# Patient Record
Sex: MALE | Race: WHITE | NOT HISPANIC OR LATINO | Employment: UNEMPLOYED | URBAN - METROPOLITAN AREA
[De-identification: names, ages, dates, MRNs, and addresses within clinical notes are randomized per-mention and may not be internally consistent; named-entity substitution may affect disease eponyms.]

---

## 2020-07-15 ENCOUNTER — LAB SERVICES (OUTPATIENT)
Dept: LAB | Age: 25
End: 2020-07-15

## 2020-07-18 LAB — SARS-COV-2 N GENE RESP QL NAA+PROBE: NEGATIVE

## 2023-09-30 ENCOUNTER — OFFICE VISIT (OUTPATIENT)
Dept: URGENT CARE | Facility: URGENT CARE | Age: 28
End: 2023-09-30
Payer: COMMERCIAL

## 2023-09-30 ENCOUNTER — ANCILLARY PROCEDURE (OUTPATIENT)
Dept: GENERAL RADIOLOGY | Facility: CLINIC | Age: 28
End: 2023-09-30
Attending: PHYSICIAN ASSISTANT
Payer: COMMERCIAL

## 2023-09-30 VITALS
TEMPERATURE: 98 F | SYSTOLIC BLOOD PRESSURE: 140 MMHG | HEART RATE: 103 BPM | DIASTOLIC BLOOD PRESSURE: 70 MMHG | OXYGEN SATURATION: 98 %

## 2023-09-30 DIAGNOSIS — S59.902A INJURY OF LEFT ELBOW, INITIAL ENCOUNTER: ICD-10-CM

## 2023-09-30 DIAGNOSIS — S59.902A INJURY OF LEFT ELBOW, INITIAL ENCOUNTER: Primary | ICD-10-CM

## 2023-09-30 PROCEDURE — 73070 X-RAY EXAM OF ELBOW: CPT | Mod: TC | Performed by: RADIOLOGY

## 2023-09-30 PROCEDURE — 99204 OFFICE O/P NEW MOD 45 MIN: CPT | Performed by: PHYSICIAN ASSISTANT

## 2023-09-30 NOTE — PATIENT INSTRUCTIONS
Follow up with ortho early this week    ED with numbness, loss of function, increased severity of pain

## 2023-09-30 NOTE — PROGRESS NOTES
SUBJECTIVE:  Chief Complaint   Patient presents with    Urgent Care    Elbow Pain     Left arm/elbow pain hard time moving arm      José Spears is a 28 year old male presents with a chief complaint of left elbow pain.  The injury occurred 1 day(s) ago.   The injury happened while walking down street tripped on planter without tree.  Significant pain with movement.  No loss of function or circulation distally    No past medical history on file.  No current outpatient medications on file.     Social History     Tobacco Use    Smoking status: Not on file    Smokeless tobacco: Not on file   Substance Use Topics    Alcohol use: Not on file       ROS:  Review of systems negative except as stated above.    EXAM:   BP (!) 140/70   Pulse 103   Temp 98  F (36.7  C) (Temporal)   SpO2 98%     Extremity: olecranon swollen, pain with flexion, extension, rotation, humerus nontender  GENERAL APPEARANCE: healthy, alert and no distress  EXTREMITIES: peripheral pulses normal  NEURO: Normal strength and tone, sensory exam grossly normal, mentation intact and speech normal    Results for orders placed or performed in visit on 09/30/23   XR Elbow Left 2 Views     Status: None    Narrative    EXAM: XR ELBOW LEFT 2 VIEWS  LOCATION: [facility] [place]  DATE: 9/30/2023    INDICATION: Left elbow pain after injury.  COMPARISON: None.      Impression    IMPRESSION: Large elbow joint effusion. No fracture visualized, although there is concern for an occult fracture due to the size of the effusion. Joint alignment is normal. No significant arthritic changes. Consider immobilization and follow-up   radiographs in 7-14 days.         ASSESSMENT:   (W59.000T) Injury of left elbow, initial encounter  (primary encounter diagnosis)  Comment: occult fracture possible  Plan: XR Elbow Left 2 Views, Orthopedic          Referral      Red flags and emergent follow up discussed, and understood by patient  Follow up with PCP  if symptoms worsen or fail to improve

## 2024-04-03 ENCOUNTER — HOSPITAL ENCOUNTER (OUTPATIENT)
Dept: ULTRASOUND IMAGING | Facility: HOSPITAL | Age: 29
Discharge: HOME | End: 2024-04-03
Attending: FAMILY MEDICINE | Admitting: FAMILY MEDICINE
Payer: COMMERCIAL

## 2024-04-03 ENCOUNTER — HOSPITAL ENCOUNTER (EMERGENCY)
Facility: HOSPITAL | Age: 29
Discharge: LEFT BEFORE DISCHARGE | End: 2024-04-03
Attending: EMERGENCY MEDICINE | Admitting: STUDENT IN AN ORGANIZED HEALTH CARE EDUCATION/TRAINING PROGRAM
Payer: COMMERCIAL

## 2024-04-03 ENCOUNTER — NURSE TRIAGE (OUTPATIENT)
Dept: NURSING | Facility: CLINIC | Age: 29
End: 2024-04-03

## 2024-04-03 ENCOUNTER — OFFICE VISIT (OUTPATIENT)
Dept: FAMILY MEDICINE | Facility: CLINIC | Age: 29
End: 2024-04-03
Payer: COMMERCIAL

## 2024-04-03 VITALS
HEART RATE: 91 BPM | WEIGHT: 198 LBS | OXYGEN SATURATION: 100 % | DIASTOLIC BLOOD PRESSURE: 81 MMHG | SYSTOLIC BLOOD PRESSURE: 136 MMHG | TEMPERATURE: 98.3 F | RESPIRATION RATE: 16 BRPM

## 2024-04-03 VITALS
TEMPERATURE: 97.4 F | WEIGHT: 198 LBS | SYSTOLIC BLOOD PRESSURE: 137 MMHG | BODY MASS INDEX: 24.62 KG/M2 | DIASTOLIC BLOOD PRESSURE: 70 MMHG | OXYGEN SATURATION: 98 % | RESPIRATION RATE: 20 BRPM | HEART RATE: 104 BPM | HEIGHT: 75 IN

## 2024-04-03 DIAGNOSIS — R17 JAUNDICE: ICD-10-CM

## 2024-04-03 DIAGNOSIS — B17.9 ACUTE HEPATITIS: Primary | ICD-10-CM

## 2024-04-03 DIAGNOSIS — Z53.21 PATIENT LEFT WITHOUT BEING SEEN: ICD-10-CM

## 2024-04-03 DIAGNOSIS — R50.9 FEVER, UNSPECIFIED FEVER CAUSE: ICD-10-CM

## 2024-04-03 DIAGNOSIS — R10.11 RUQ ABDOMINAL PAIN: ICD-10-CM

## 2024-04-03 LAB
ALBUMIN SERPL BCG-MCNC: 3.8 G/DL (ref 3.5–5.2)
ALBUMIN UR-MCNC: NEGATIVE MG/DL
ALP SERPL-CCNC: 259 U/L (ref 40–150)
ALT SERPL W P-5'-P-CCNC: 623 U/L (ref 0–70)
ANION GAP SERPL CALCULATED.3IONS-SCNC: 10 MMOL/L (ref 7–15)
APPEARANCE UR: CLEAR
AST SERPL W P-5'-P-CCNC: 496 U/L (ref 0–45)
BILIRUB DIRECT SERPL-MCNC: 4.52 MG/DL (ref 0–0.3)
BILIRUB SERPL-MCNC: 6.4 MG/DL
BILIRUB UR QL STRIP: NEGATIVE
BUN SERPL-MCNC: 3.8 MG/DL (ref 6–20)
CALCIUM SERPL-MCNC: 9.1 MG/DL (ref 8.6–10)
CHLORIDE SERPL-SCNC: 98 MMOL/L (ref 98–107)
COLOR UR AUTO: YELLOW
CREAT SERPL-MCNC: 0.64 MG/DL (ref 0.67–1.17)
DEPRECATED HCO3 PLAS-SCNC: 28 MMOL/L (ref 22–29)
EGFRCR SERPLBLD CKD-EPI 2021: >90 ML/MIN/1.73M2
ERYTHROCYTE [DISTWIDTH] IN BLOOD BY AUTOMATED COUNT: 13.5 % (ref 10–15)
GLUCOSE SERPL-MCNC: 91 MG/DL (ref 70–99)
GLUCOSE UR STRIP-MCNC: NEGATIVE MG/DL
HCT VFR BLD AUTO: 34.5 % (ref 40–53)
HGB BLD-MCNC: 12.4 G/DL (ref 13.3–17.7)
HGB UR QL STRIP: NEGATIVE
KETONES UR STRIP-MCNC: NEGATIVE MG/DL
LEUKOCYTE ESTERASE UR QL STRIP: NEGATIVE
MCH RBC QN AUTO: 33.7 PG (ref 26.5–33)
MCHC RBC AUTO-ENTMCNC: 35.9 G/DL (ref 31.5–36.5)
MCV RBC AUTO: 94 FL (ref 78–100)
NITRATE UR QL: NEGATIVE
NRBC # BLD AUTO: 0 10E3/UL
NRBC BLD AUTO-RTO: 0 /100
PH UR STRIP: 6.5 [PH] (ref 5–8)
PLATELET # BLD AUTO: 124 10E3/UL (ref 150–450)
POTASSIUM SERPL-SCNC: 4.5 MMOL/L (ref 3.4–5.3)
PROT SERPL-MCNC: 7.5 G/DL (ref 6.4–8.3)
RBC # BLD AUTO: 3.68 10E6/UL (ref 4.4–5.9)
SODIUM SERPL-SCNC: 136 MMOL/L (ref 135–145)
SP GR UR STRIP: <=1.005 (ref 1–1.03)
UROBILINOGEN UR STRIP-ACNC: 0.2 E.U./DL
WBC # BLD AUTO: 10 10E3/UL (ref 4–11)

## 2024-04-03 PROCEDURE — 86803 HEPATITIS C AB TEST: CPT | Performed by: FAMILY MEDICINE

## 2024-04-03 PROCEDURE — 36415 COLL VENOUS BLD VENIPUNCTURE: CPT | Performed by: FAMILY MEDICINE

## 2024-04-03 PROCEDURE — 86665 EPSTEIN-BARR CAPSID VCA: CPT | Performed by: FAMILY MEDICINE

## 2024-04-03 PROCEDURE — 76705 ECHO EXAM OF ABDOMEN: CPT

## 2024-04-03 PROCEDURE — 85025 COMPLETE CBC W/AUTO DIFF WBC: CPT | Performed by: FAMILY MEDICINE

## 2024-04-03 PROCEDURE — 99281 EMR DPT VST MAYX REQ PHY/QHP: CPT

## 2024-04-03 PROCEDURE — 82248 BILIRUBIN DIRECT: CPT | Performed by: FAMILY MEDICINE

## 2024-04-03 PROCEDURE — 99214 OFFICE O/P EST MOD 30 MIN: CPT | Performed by: FAMILY MEDICINE

## 2024-04-03 PROCEDURE — 86706 HEP B SURFACE ANTIBODY: CPT | Performed by: FAMILY MEDICINE

## 2024-04-03 PROCEDURE — 85007 BL SMEAR W/DIFF WBC COUNT: CPT | Performed by: FAMILY MEDICINE

## 2024-04-03 PROCEDURE — 87340 HEPATITIS B SURFACE AG IA: CPT | Performed by: FAMILY MEDICINE

## 2024-04-03 PROCEDURE — 87389 HIV-1 AG W/HIV-1&-2 AB AG IA: CPT | Performed by: FAMILY MEDICINE

## 2024-04-03 PROCEDURE — 81003 URINALYSIS AUTO W/O SCOPE: CPT | Performed by: FAMILY MEDICINE

## 2024-04-03 PROCEDURE — 80053 COMPREHEN METABOLIC PANEL: CPT | Performed by: FAMILY MEDICINE

## 2024-04-03 RX ORDER — BENZONATATE 100 MG/1
100 CAPSULE ORAL 3 TIMES DAILY PRN
COMMUNITY

## 2024-04-03 RX ORDER — ONDANSETRON 4 MG/1
4 TABLET, ORALLY DISINTEGRATING ORAL EVERY 8 HOURS PRN
COMMUNITY

## 2024-04-03 ASSESSMENT — COLUMBIA-SUICIDE SEVERITY RATING SCALE - C-SSRS
1. IN THE PAST MONTH, HAVE YOU WISHED YOU WERE DEAD OR WISHED YOU COULD GO TO SLEEP AND NOT WAKE UP?: NO
2. HAVE YOU ACTUALLY HAD ANY THOUGHTS OF KILLING YOURSELF IN THE PAST MONTH?: NO
6. HAVE YOU EVER DONE ANYTHING, STARTED TO DO ANYTHING, OR PREPARED TO DO ANYTHING TO END YOUR LIFE?: NO

## 2024-04-03 ASSESSMENT — ACTIVITIES OF DAILY LIVING (ADL): ADLS_ACUITY_SCORE: 35

## 2024-04-03 NOTE — LETTER
April 3, 2024      José Spears  [address] [place] [address]        To Whom It May Concern:    José Spears  was seen on 4/3/2024   Please excuse him  due to illness.        Sincerely,        Jack Diane MD

## 2024-04-03 NOTE — PROGRESS NOTES
"OUTPATIENT VISIT NOTE                                                   Date of Visit: 4/3/2024     Chief Complaint   Patient presents with:  Fever: X 5 days, went to urgent care at [facility] on Saturday, pt states \"I was tested for a lot of things\" and everything was negative. Says his urine has been dark yellow, when he is hydrating, its bright yellow. Sees foaming residue when urinating. Side pain as well.            History of Present Illness   José Spears is a 28 year old male has had a fever as high as 104 consistently for the last week. Initially shaking chills and sweats.  Urine changed from dark yellow to bright yellow.  Lots of foam in urine. No dysuria. No blood in urine.  Lots of fatigue.  Headache.  Feels \"foggy\".  Thinks his face looks yellow--started last night.  Pain on both sides of abdomen, intermittently.  Mild pain to touch upper left chest.  No shortness of breath.  Nausea in the mornings.  Had vomiting in the morning for the last three days.  Has had cough--since January--has seemed to improved. Productive--2-3 weeks ago--resolved now.  Diarrhea--about a week ago.  No blood in stools.  No cramping. No miguel colored stools.  Last dose of antipyretic 9 hours ago.  No rashes.      Seen at [facility] three days ago with negative covid test, negative influenza, negative strep and negative CXR.  Given benzonatate, ondansetran and cough syrup.  The abdominal pain and yellow skin started two days ago.  No recent travel.    Smokes cigar once a month.  Alcohol 8-10 drinks a week.    States he feels better today than yesterday.             MEDICATIONS   Current Outpatient Medications   Medication Sig Dispense Refill    benzonatate (TESSALON) 100 MG capsule Take 100 mg by mouth 3 times daily as needed      ondansetron (ZOFRAN ODT) 4 MG ODT tab 4 mg every 8 hours as needed       No current facility-administered medications for this visit.         SOCIAL HISTORY   Social History     Tobacco " Use    Smoking status: Some Days     Types: Cigars    Smokeless tobacco: Not on file   Substance Use Topics    Alcohol use: Not on file           Physical Exam   Vitals:    04/03/24 1604   BP: 136/81   BP Location: Right arm   Patient Position: Sitting   Cuff Size: Adult Regular   Pulse: 91   Resp: 16   Temp: 98.3  F (36.8  C)   TempSrc: Oral   SpO2: 100%   Weight: 89.8 kg (198 lb)        GENERAL: Alert, Oriented. NAD  EYES: sclera mildly icteric  HENT:  Ears: R TM pearly gray with normal landmarks. L TM pearly gray with normal landmarks.  Nose:  Clear  Oropharynx: No erythema. No exudate.  NECK: Neck supple. No adenopathy  LUNGS:  Clear to ascultation,  No crackles.  No wheezing.  Normal effort.  HEART:  RRR without murmur rub or gallop.  Chest Wall: mild tenderness left upper sternal border  ABDOMEN:  +BS. Slight tenderness right upper quadrant and less tenderness left upper quadrant. No hepatomegaly or spleen tip.  Soft, no guarding, rebound, rigidity,mass. No CVA tenderness    SKIN:  slight jaundice.   Extremities: no swelling.      Diagnostic Studies   LABS:  Results for orders placed or performed in visit on 04/03/24   US Abdomen Limited     Status: None    Narrative    EXAM: US ABDOMEN LIMITED  LOCATION: [facility]  DATE: 4/3/2024    INDICATION: RUQ pain, jaundice, fever  COMPARISON: None.  TECHNIQUE: Limited abdominal ultrasound.    FINDINGS:    GALLBLADDER: Normal. No gallstones, wall thickening, or pericholecystic fluid. Negative sonographic Vegas's sign.    BILE DUCTS: No biliary dilatation. The common duct measures 3 mm.    LIVER: Normal parenchyma with smooth contour. No focal mass.    RIGHT KIDNEY: No hydronephrosis.    PANCREAS: The visualized portions are normal.    No ascites.    The spleen is enlarged measuring 17.2 cm.      Impression    IMPRESSION:  1.  Gallbladder and liver within normal limits.  2.  Splenomegaly with the spleen measuring 17.2 cm.       UA Macroscopic  with reflex to Microscopic and Culture     Status: Normal    Specimen: Urine, Clean Catch   Result Value Ref Range    Color Urine Yellow Colorless, Straw, Light Yellow, Yellow    Appearance Urine Clear Clear    Glucose Urine Negative Negative mg/dL    Bilirubin Urine Negative Negative    Ketones Urine Negative Negative mg/dL    Specific Gravity Urine <=1.005 1.005 - 1.030    Blood Urine Negative Negative    pH Urine 6.5 5.0 - 8.0    Protein Albumin Urine Negative Negative mg/dL    Urobilinogen Urine 0.2 0.2, 1.0 E.U./dL    Nitrite Urine Negative Negative    Leukocyte Esterase Urine Negative Negative    Narrative    Microscopic not indicated   Basic metabolic panel     Status: Abnormal   Result Value Ref Range    Sodium 136 135 - 145 mmol/L    Potassium 4.5 3.4 - 5.3 mmol/L    Chloride 98 98 - 107 mmol/L    Carbon Dioxide (CO2) 28 22 - 29 mmol/L    Anion Gap 10 7 - 15 mmol/L    Urea Nitrogen 3.8 (L) 6.0 - 20.0 mg/dL    Creatinine 0.64 (L) 0.67 - 1.17 mg/dL    GFR Estimate >90 >60 mL/min/1.73m2    Calcium 9.1 8.6 - 10.0 mg/dL    Glucose 91 70 - 99 mg/dL   Hepatic function panel     Status: Abnormal   Result Value Ref Range    Protein Total 7.5 6.4 - 8.3 g/dL    Albumin 3.8 3.5 - 5.2 g/dL    Bilirubin Total 6.4 (H) <=1.2 mg/dL    Alkaline Phosphatase 259 (H) 40 - 150 U/L     (H) 0 - 45 U/L     (HH) 0 - 70 U/L    Bilirubin Direct 4.52 (H) 0.00 - 0.30 mg/dL   CBC with Platelets & Differential     Status: None (In process)    Narrative    The following orders were created for panel order CBC with Platelets & Differential.  Procedure                               Abnormality         Status                     ---------                               -----------         ------                     CBC with platelets and d...[[id number]]                      In process                 RBC and Platelet Morphology[[id number]]                      In process                   Please view results for these tests on the  individual orders.            Assessment and Plan     Acute hepatitis  - PRIMARY CARE FOLLOW-UP SCHEDULING  Jaundice  - UA Macroscopic with reflex to Microscopic and Culture  - CBC with Platelets & Differential  - Basic metabolic panel  - Hepatic function panel  - HIV Antigen Antibody Combo Cascade  - Hepatitis B surface antigen  - Hepatitis B Surface Antibody  - EBV Capsid Antibody IgM  - Hepatitis C Screen Reflex to HCV RNA Quant and Genotype  - US Abdomen Limited  - Hepatitis B surface antigen  - Hepatitis B Surface Antibody  - EBV Capsid Antibody IgM  - Hepatitis C Screen Reflex to HCV RNA Quant and Genotype  Fever, unspecified fever cause  - UA Macroscopic with reflex to Microscopic and Culture  - CBC with Platelets & Differential  - Basic metabolic panel  - Hepatic function panel  - HIV Antigen Antibody Combo Cascade  - Hepatitis B surface antigen  - Hepatitis B Surface Antibody  - EBV Capsid Antibody IgM  - Hepatitis C Screen Reflex to HCV RNA Quant and Genotype  - US Abdomen Limited  - Hepatitis B surface antigen  - Hepatitis B Surface Antibody  - EBV Capsid Antibody IgM  - Hepatitis C Screen Reflex to HCV RNA Quant and Genotype  RUQ abdominal pain  - HIV Antigen Antibody Combo Cascade  - Hepatitis B surface antigen  - Hepatitis B Surface Antibody  - EBV Capsid Antibody IgM  - Hepatitis C Screen Reflex to HCV RNA Quant and Genotype  - US Abdomen Limited  - Hepatitis B surface antigen  - Hepatitis B Surface Antibody  - EBV Capsid Antibody IgM  - Hepatitis C Screen Reflex to HCV RNA Quant and Genotype     Acute hepatitis--likely viral hepatitis.  LFTS very elevated, but little abdominal pain.  Patient appears well  Liver/gallbladder on US are normal.  Enlarged spleen--normal hgb by lab report. Not palpable on exam.    No tylenol. No alcohol.  Good fluid intake.  No sports.  Diet as tolerated.  Go to ER if worsening--including fever, worsening pain, intractable vomiting among others.  Follow up with primary  care on 4/8/2024.      Discussed signs / symptoms that warrant urgent / emergent medical attention.   Recheck if worsening or not improving.       Jack Diane MD          Pertinent History     The following portions of the patient's history were reviewed and updated as appropriate: allergies, current medications, past family history, past medical history, past social history, past surgical history and problem list.

## 2024-04-03 NOTE — TELEPHONE ENCOUNTER
DATE/TIME OF CALL RECEIVED FROM LAB:  04/03/24 at 6:19 PM   LAB TEST:  ALT  LAB VALUE:  623  PROVIDER NOTIFIED?: Yes  PROVIDER NAME: On Call paged @ 6:26 pm.  On Call re-paged @ 6:43 pm.  On Call re-paged @ 6:57 pm.  Dr. Geovanna Danielle returned page @ 6:59 pm    DATE/TIME LAB VALUE REPORTED TO PROVIDER: 4/3/24 @ 6:59 pm.  MECHANISM OF PROVIDER NOTIFICATION: Page  PROVIDER RESPONSE: Dr. Danielle will review pt medical record and take the needed next steps.  Karina Coleman RN  [facility]NA Nurse Advisor

## 2024-04-04 ENCOUNTER — OFFICE VISIT (OUTPATIENT)
Dept: FAMILY MEDICINE | Facility: CLINIC | Age: 29
End: 2024-04-04
Payer: COMMERCIAL

## 2024-04-04 VITALS
BODY MASS INDEX: 24.37 KG/M2 | DIASTOLIC BLOOD PRESSURE: 76 MMHG | HEIGHT: 75 IN | HEART RATE: 87 BPM | OXYGEN SATURATION: 98 % | TEMPERATURE: 97.6 F | RESPIRATION RATE: 16 BRPM | WEIGHT: 196 LBS | SYSTOLIC BLOOD PRESSURE: 122 MMHG

## 2024-04-04 DIAGNOSIS — B27.99 INFECTIOUS MONONUCLEOSIS, WITH OTHER COMPLICATION, INFECTIOUS MONONUCLEOSIS DUE TO UNSPECIFIED ORGANISM: Primary | ICD-10-CM

## 2024-04-04 DIAGNOSIS — B17.9 ACUTE HEPATITIS: ICD-10-CM

## 2024-04-04 LAB
ACANTHOCYTES BLD QL SMEAR: ABNORMAL
AUER BODIES BLD QL SMEAR: ABNORMAL
BASO STIPL BLD QL SMEAR: ABNORMAL
BASOPHILS # BLD AUTO: 0.1 10E3/UL (ref 0–0.2)
BASOPHILS # BLD MANUAL: 0 10E3/UL (ref 0–0.2)
BASOPHILS NFR BLD AUTO: 1 %
BASOPHILS NFR BLD MANUAL: 0 %
BITE CELLS BLD QL SMEAR: ABNORMAL
BLISTER CELLS BLD QL SMEAR: ABNORMAL
BURR CELLS BLD QL SMEAR: ABNORMAL
DACRYOCYTES BLD QL SMEAR: ABNORMAL
ELLIPTOCYTES BLD QL SMEAR: ABNORMAL
EOSINOPHIL # BLD AUTO: 0.1 10E3/UL (ref 0–0.7)
EOSINOPHIL # BLD MANUAL: 0 10E3/UL (ref 0–0.7)
EOSINOPHIL NFR BLD AUTO: 1 %
EOSINOPHIL NFR BLD MANUAL: 0 %
ERYTHROCYTE [DISTWIDTH] IN BLOOD BY AUTOMATED COUNT: 14 % (ref 10–15)
FRAGMENTS BLD QL SMEAR: ABNORMAL
GIANT PLATELETS BLD QL SMEAR: ABNORMAL
HBV SURFACE AB SERPL IA-ACNC: <3.5 M[IU]/ML
HBV SURFACE AB SERPL IA-ACNC: NONREACTIVE M[IU]/ML
HBV SURFACE AG SERPL QL IA: NONREACTIVE
HCT VFR BLD AUTO: 32.3 % (ref 40–53)
HCV AB SERPL QL IA: NONREACTIVE
HGB BLD-MCNC: 11.9 G/DL (ref 13.3–17.7)
HGB C CRYSTALS: ABNORMAL
HIV 1+2 AB+HIV1 P24 AG SERPL QL IA: NONREACTIVE
HOWELL-JOLLY BOD BLD QL SMEAR: ABNORMAL
IMM GRANULOCYTES # BLD: 0.1 10E3/UL
IMM GRANULOCYTES NFR BLD: 1 %
LYMPHOCYTES # BLD AUTO: 6 10E3/UL (ref 0.8–5.3)
LYMPHOCYTES # BLD MANUAL: 6.3 10E3/UL (ref 0.8–5.3)
LYMPHOCYTES NFR BLD AUTO: 69 %
LYMPHOCYTES NFR BLD MANUAL: 63 %
MCH RBC QN AUTO: 35.2 PG (ref 26.5–33)
MCHC RBC AUTO-ENTMCNC: 36.8 G/DL (ref 31.5–36.5)
MCV RBC AUTO: 96 FL (ref 78–100)
METAMYELOCYTES # BLD MANUAL: 0.1 10E3/UL
METAMYELOCYTES NFR BLD MANUAL: 1 %
MONOCYTES # BLD AUTO: 0.5 10E3/UL (ref 0–1.3)
MONOCYTES # BLD MANUAL: 0.6 10E3/UL (ref 0–1.3)
MONOCYTES NFR BLD AUTO: 6 %
MONOCYTES NFR BLD MANUAL: 6 %
NEUTROPHILS # BLD AUTO: 2 10E3/UL (ref 1.6–8.3)
NEUTROPHILS # BLD MANUAL: 2.7 10E3/UL (ref 1.6–8.3)
NEUTROPHILS NFR BLD AUTO: 23 %
NEUTROPHILS NFR BLD MANUAL: 27 %
NEUTS HYPERSEG BLD QL SMEAR: ABNORMAL
NRBC # BLD AUTO: 0 10E3/UL
NRBC BLD AUTO-RTO: 0 /100
OTHER CELLS # BLD MANUAL: 0.3 10E3/UL
OTHER CELLS NFR BLD MANUAL: 3 %
PATH REV: ABNORMAL
PATH REV: ABNORMAL
PLAT MORPH BLD: ABNORMAL
PLAT MORPH BLD: ABNORMAL
PLATELET # BLD AUTO: 141 10E3/UL (ref 150–450)
POLYCHROMASIA BLD QL SMEAR: ABNORMAL
RBC # BLD AUTO: 3.38 10E6/UL (ref 4.4–5.9)
RBC AGGLUT BLD QL: ABNORMAL
RBC MORPH BLD: ABNORMAL
RBC MORPH BLD: ABNORMAL
ROULEAUX BLD QL SMEAR: ABNORMAL
SICKLE CELLS BLD QL SMEAR: ABNORMAL
SMUDGE CELLS BLD QL SMEAR: ABNORMAL
SPHEROCYTES BLD QL SMEAR: ABNORMAL
STOMATOCYTES BLD QL SMEAR: ABNORMAL
TARGETS BLD QL SMEAR: ABNORMAL
TOXIC GRANULES BLD QL SMEAR: ABNORMAL
VARIANT LYMPHS BLD QL SMEAR: PRESENT
VARIANT LYMPHS BLD QL SMEAR: PRESENT
WBC # BLD AUTO: 8.7 10E3/UL (ref 4–11)

## 2024-04-04 PROCEDURE — 86664 EPSTEIN-BARR NUCLEAR ANTIGEN: CPT | Performed by: FAMILY MEDICINE

## 2024-04-04 PROCEDURE — 80076 HEPATIC FUNCTION PANEL: CPT | Performed by: FAMILY MEDICINE

## 2024-04-04 PROCEDURE — 85025 COMPLETE CBC W/AUTO DIFF WBC: CPT | Performed by: FAMILY MEDICINE

## 2024-04-04 PROCEDURE — 99213 OFFICE O/P EST LOW 20 MIN: CPT | Performed by: FAMILY MEDICINE

## 2024-04-04 PROCEDURE — 86665 EPSTEIN-BARR CAPSID VCA: CPT | Performed by: FAMILY MEDICINE

## 2024-04-04 PROCEDURE — 36415 COLL VENOUS BLD VENIPUNCTURE: CPT | Performed by: FAMILY MEDICINE

## 2024-04-04 RX ORDER — DEXTROMETHORPHAN HYDROBROMIDE AND PROMETHAZINE HYDROCHLORIDE 15; 6.25 MG/5ML; MG/5ML
5 SYRUP ORAL
COMMUNITY
Start: 2024-03-31

## 2024-04-04 NOTE — ED TRIAGE NOTES
Pt reports was seen at clinic today for abd pain and jaundice. Pt's skin is noticeably discolored.  Pt found to have abnormal lab levels and an enlarged spleen.  Told to come in by clinic staff.  Pt denies previous liver issues, or gall bladder issues.      Triage Assessment (Adult)       Row Name 04/03/24 1938          Triage Assessment    Airway WDL WDL        Respiratory WDL    Respiratory WDL WDL        Skin Circulation/Temperature WDL    Skin Circulation/Temperature WDL WDL        Cardiac WDL    Cardiac WDL WDL        Peripheral/Neurovascular WDL    Peripheral Neurovascular WDL WDL        Cognitive/Neuro/Behavioral WDL    Cognitive/Neuro/Behavioral WDL WDL

## 2024-04-04 NOTE — TELEPHONE ENCOUNTER
Patient calling back, confused if he needs to actually go to the ED or not.     It appears that the previous FNA nurse paged the wrong provider. Since the patient was seen at [facility], the critical lab call should've gone to the [facility] provider.     According to [facility] note, provider discussed critical labs (elevated liver and US) with him in person. Advised to follow up in clinic on Monday.       Since on-call provider for [facility] was paged previously and advised ED, I paged her again to discuss situation. Dr. Danielle called back. She read through notes and advised that pt should be seen tomorrow in clinic for an eval and repeat labs.     This RN gave patient the information. I explained that if he starts feeling worse, he should go to the ED.     I called scheduling but no appointments available at surrounding clinics for tomorrow or Friday. Advised pt to call the clinic in the morning to see if any same-day visits open up. Patient verbalized understanding and had no further questions.      Tessy Khan RN  [place] Nurse Advisor  4/3/2024 8:47 PM

## 2024-04-04 NOTE — TELEPHONE ENCOUNTER
Clinic Action Needed: Yes, pt needs a follow up appointment with a provider. See FNA note below and recommendation from Dr. Danielle.     Pt can be reached at # on file to schedule.     Tessy Khan RN  [place] Nurse Advisor  4/3/2024 8:48 PM

## 2024-04-04 NOTE — TELEPHONE ENCOUNTER
Re:  Critical Result  -  .  After review of chart Dr. Danielle On Call for [facility] called FNA Triage RN @ 7:05 pm and asked that pt be called and told to come to ED, preferably [facility] ED, stating liver function isn't working well and need to determine if pt has infection or blockage.  This message called to pt and received VM, left message for pt tat 7:12 pm to go to ED and to call clinic back.  Karina Coleman RN  [facility] Nurse Advisor

## 2024-04-04 NOTE — ED NOTES
Pt left before being seen by provider. Pt is speaking with PCP and wants to get clear communication from them before being seen in the ER. Pt states he will come back if that is what his provider wants, but he does not want to be charged if he is able to be seen outpatient verse ER. Provider has not seen patient yet and pt has left.

## 2024-04-04 NOTE — ED NOTES
"Pt would like to wait to get IV and labs drawn until seen by provider due to being confused about why he was told to come in. Pt left appointment believing everything was good because doctor told him he was \"ok\" but minutes after leaving he got a call from a nurse stating his labs were critical and he needs to come to ER.   "

## 2024-04-04 NOTE — TELEPHONE ENCOUNTER
FNA Triage RN was able to contact pt and pt will return to 's ED as advised by On Call MD Dr. Danielle due to critical liver function ALT of 623.  Karina Coleman RN  [facility] Nurse Advisor

## 2024-04-04 NOTE — PROGRESS NOTES
Assessment & Plan     Infectious mononucleosis, with other complication, infectious mononucleosis due to unspecified organism  Labs improved.  Continue avoiding any activity that can result in an impact to the abdomen.  - Hepatic panel (Albumin, ALT, AST, Bili, Alk Phos, TP)    Acute hepatitis  - EBV Capsid Antibody IgG  - EBV Nuclear Antigen EBNA Antibody IgG  - Hepatic panel (Albumin, ALT, AST, Bili, Alk Phos, TP)  - CBC with platelets and differential  Repeat in one week.  - Hepatic panel (Albumin, ALT, AST, Bili, Alk Phos, TP)  Nicotine/Tobacco Cessation  He reports that he has been smoking cigars. He has never used smokeless tobacco.  Nicotine/Tobacco Cessation Plan  Patient does not feel this requires more than his willpower.  Leena Kumar is a 28 year old, presenting for the following health issues:  office visit  and Recheck Medication (Pt is here for office visit )      4/4/2024     9:56 AM   Additional Questions   Roomed by benson         4/4/2024     9:56 AM   Patient Reported Additional Medications   Patient reports taking the following new medications yes     Following up high liver functions found at the urgent care.  He had already reduced his alcohol intake to 1-2 three days a week a while ago.   Feeling better. Urine is less foamy. It was a brighter yellow.   Was seen five days ago due to fatigue and hypersomnia all weekend and drinking a lot of water and Pedialyte,less hungry. That has improved. Fever of 102-104. Took Advil. It slowly quit spiking. Had a sore throat.  He knows that he had the full series Hepatitis A and the hepatitis B.    History of Present Illness       Reason for visit:  Followup  Symptom onset:  1-2 weeks ago    He eats 2-3 servings of fruits and vegetables daily.He consumes 1 sweetened beverage(s) daily.He exercises with enough effort to increase his heart rate 9 or less minutes per day.  He exercises with enough effort to increase his heart rate 3 or less days per  "week.   He is taking medications regularly.      Objective    /76 (BP Location: Left arm, Patient Position: Sitting, Cuff Size: Adult Regular)   Pulse 87   Temp 97.6  F (36.4  C) (Tympanic)   Resp 16   Ht 1.892 m (6' 2.5\")   Wt 88.9 kg (196 lb)   SpO2 98%   BMI 24.83 kg/m    Body mass index is 24.83 kg/m .  Physical Exam   GENERAL: alert and no distress  EYES: Eyes grossly normal to inspection, PERRL and conjunctivae and sclerae normal  HENT: ear canals and TM's normal, nose and mouth without ulcers or lesions  NECK: no adenopathy except one 1cm lymph node on the right posterior sternocleidomastoid, no asymmetry, masses, or scars  RESP: lungs clear to auscultation - no rales, rhonchi or wheezes  CV: regular rate and rhythm, normal S1 S2, no S3 or S4, no murmur, click or rub, no peripheral edema  ABDOMEN: soft, nontender, liver is 4 cm below the right costal line. The spleen is palpable, no masses and bowel sounds normal  MS: no gross musculoskeletal defects noted, no edema  SKIN: no suspicious lesions or rashes  NEURO: Normal strength and tone, mentation intact and speech normal  PSYCH: mentation appears normal, affect normal/bright    Results for orders placed or performed in visit on 04/04/24   EBV Capsid Antibody IgG     Status: Abnormal   Result Value Ref Range    EBV Capsid Antoinette IgG Instrument Value 75.0 (H) <18.0 U/mL    EBV Capsid Antibody IgG Positive (A) No detectable antibody.   EBV Nuclear Antigen EBNA Antibody IgG     Status: Normal   Result Value Ref Range    EBV Nuclear Ag Antoinette IgG Instrument Value <3.0 <18.0 U/mL    EBV Nuclear Antigen Antibody IgG No detectable antibody. No detectable antibody.   Hepatic panel (Albumin, ALT, AST, Bili, Alk Phos, TP)     Status: Abnormal   Result Value Ref Range    Protein Total 7.3 6.4 - 8.3 g/dL    Albumin 3.8 3.5 - 5.2 g/dL    Bilirubin Total 4.5 (H) <=1.2 mg/dL    Alkaline Phosphatase 246 (H) 40 - 150 U/L     (H) 0 - 45 U/L     (HH) 0 - 70 " U/L    Bilirubin Direct 3.24 (H) 0.00 - 0.30 mg/dL   CBC with platelets and differential     Status: Abnormal   Result Value Ref Range    WBC Count 8.7 4.0 - 11.0 10e3/uL    RBC Count 3.38 (L) 4.40 - 5.90 10e6/uL    Hemoglobin 11.9 (L) 13.3 - 17.7 g/dL    Hematocrit 32.3 (L) 40.0 - 53.0 %    MCV 96 78 - 100 fL    MCH 35.2 (H) 26.5 - 33.0 pg    MCHC 36.8 (H) 31.5 - 36.5 g/dL    RDW 14.0 10.0 - 15.0 %    Platelet Count 141 (L) 150 - 450 10e3/uL    % Neutrophils 23 %    % Lymphocytes 69 %    % Monocytes 6 %    % Eosinophils 1 %    % Basophils 1 %    % Immature Granulocytes 1 %    NRBCs per 100 WBC 0 <1 /100    Absolute Neutrophils 2.0 1.6 - 8.3 10e3/uL    Absolute Lymphocytes 6.0 (H) 0.8 - 5.3 10e3/uL    Absolute Monocytes 0.5 0.0 - 1.3 10e3/uL    Absolute Eosinophils 0.1 0.0 - 0.7 10e3/uL    Absolute Basophils 0.1 0.0 - 0.2 10e3/uL    Absolute Immature Granulocytes 0.1 <=0.4 10e3/uL    Absolute NRBCs 0.0 10e3/uL   RBC and Platelet Morphology     Status: Abnormal   Result Value Ref Range    RBC Morphology Confirmed RBC Indices     Platelet Assessment  Automated Count Confirmed. Platelet morphology is normal.     Automated Count Confirmed. Platelet morphology is normal.    Giant Platelets      Acanthocytes      Heron Rods      Basophilic Stippling      Bite Cells      Blister Cells      [place] Cells      Elliptocytes      Hgb C Crystals      Kilpatrick-Jolly Bodies      Hypersegmented Neutrophils      Polychromasia      RBC agglutination      RBC Fragments      Reactive Lymphocytes Present (A) None Seen    Rouleaux      Sickle Cells      Smudge Cells      Spherocytes      Stomatocytes      Target Cells      Teardrop Cells      Toxic Neutrophils      Pathologist Review Comments (Blood)     CBC with platelets and differential     Status: Abnormal    Narrative    The following orders were created for panel order CBC with platelets and differential.  Procedure                               Abnormality         Status                      ---------                               -----------         ------                     CBC with platelets and d...[[id number]]  Abnormal            Final result               RBC and Platelet Morphology[[id number]]  Abnormal            Final result                 Please view results for these tests on the individual orders.   Signed Electronically by: CAROL MALHOTRA MD

## 2024-04-04 NOTE — PATIENT INSTRUCTIONS
No tylenol or acetaminophen.    No alcohol.    May use advil (ibuprofen) or aleve (naproxen)    Good fluid intake.  Diet as tolerated.    No sports.    Call primary care for follow-up:   [place] [phone]   [place] [phone]   [place] [phone]     Set up appointment for Monday for follow up of acute hepatitis.

## 2024-04-05 ENCOUNTER — TELEPHONE (OUTPATIENT)
Dept: FAMILY MEDICINE | Facility: CLINIC | Age: 29
End: 2024-04-05

## 2024-04-05 ENCOUNTER — NURSE TRIAGE (OUTPATIENT)
Dept: NURSING | Facility: CLINIC | Age: 29
End: 2024-04-05
Payer: COMMERCIAL

## 2024-04-05 LAB
ALBUMIN SERPL BCG-MCNC: 3.8 G/DL (ref 3.5–5.2)
ALP SERPL-CCNC: 246 U/L (ref 40–150)
ALT SERPL W P-5'-P-CCNC: 512 U/L (ref 0–70)
AST SERPL W P-5'-P-CCNC: 374 U/L (ref 0–45)
BILIRUB DIRECT SERPL-MCNC: 3.24 MG/DL (ref 0–0.3)
BILIRUB SERPL-MCNC: 4.5 MG/DL
EBV NA IGG SER IA-ACNC: <3 U/ML
EBV NA IGG SER IA-ACNC: NORMAL [IU]/ML
EBV VCA IGG SER IA-ACNC: 75 U/ML
EBV VCA IGG SER IA-ACNC: POSITIVE
EBV VCA IGM SER IA-ACNC: >160 U/ML
EBV VCA IGM SER IA-ACNC: ABNORMAL
PROT SERPL-MCNC: 7.3 G/DL (ref 6.4–8.3)

## 2024-04-05 NOTE — TELEPHONE ENCOUNTER
04/05 when he was in you told me to schedule him on 04/11/2024  at 2:30 your approval slot do you wand me to move him back to 04/19????

## 2024-04-05 NOTE — TELEPHONE ENCOUNTER
Nurse Triage SBAR    Is this a 2nd Level Triage? YES, LICENSED PRACTITIONER REVIEW IS REQUIRED    Situation:   Critical lab -     Background:   Pt was seen in clinic yesterday for a follow up for acute hepatitis.  He was seen in [facility] the day before and ALT was 623.    Assessment:       Recommendation:   3:20 AM paged Dr. Danielle  3:39 AM answering service call.  Dr. Danielle states that there is nothing we need to do at this moment.    Nupur Laguna, RN, BSN Nurse Triage Advisor 4/5/2024 3:43 AM     Paged to provider    Does the patient meet one of the following criteria for ADS visit consideration? No

## 2024-04-08 ENCOUNTER — TELEPHONE (OUTPATIENT)
Dept: FAMILY MEDICINE | Facility: CLINIC | Age: 29
End: 2024-04-08
Payer: COMMERCIAL

## 2024-04-08 NOTE — TELEPHONE ENCOUNTER
04/08 lm today to call back and talk to care team to schedule an appt to be seen by Shashi reza to use around the 19th of April

## 2024-04-08 NOTE — TELEPHONE ENCOUNTER
----- Message from Geovanna Danielle MD sent at 4/5/2024 12:04 PM CDT -----  Regarding: Auth spot  Hello.  I need to see José on 4-19-24 or close to that. Auth spot is fine.  Thank you.  Geovanna Danielle MD

## 2024-04-09 ENCOUNTER — TELEPHONE (OUTPATIENT)
Dept: FAMILY MEDICINE | Facility: CLINIC | Age: 29
End: 2024-04-09
Payer: COMMERCIAL

## 2024-04-10 ENCOUNTER — LAB (OUTPATIENT)
Dept: LAB | Facility: CLINIC | Age: 29
End: 2024-04-10
Payer: COMMERCIAL

## 2024-04-10 DIAGNOSIS — B27.99 INFECTIOUS MONONUCLEOSIS, WITH OTHER COMPLICATION, INFECTIOUS MONONUCLEOSIS DUE TO UNSPECIFIED ORGANISM: ICD-10-CM

## 2024-04-10 DIAGNOSIS — B17.9 ACUTE HEPATITIS: ICD-10-CM

## 2024-04-10 LAB
ALBUMIN SERPL BCG-MCNC: 4.1 G/DL (ref 3.5–5.2)
ALP SERPL-CCNC: 193 U/L (ref 40–150)
ALT SERPL W P-5'-P-CCNC: 197 U/L (ref 0–70)
AST SERPL W P-5'-P-CCNC: 93 U/L (ref 0–45)
BILIRUB DIRECT SERPL-MCNC: 0.92 MG/DL (ref 0–0.3)
BILIRUB SERPL-MCNC: 1.7 MG/DL
PROT SERPL-MCNC: 7.8 G/DL (ref 6.4–8.3)

## 2024-04-10 PROCEDURE — 80076 HEPATIC FUNCTION PANEL: CPT

## 2024-04-10 PROCEDURE — 36415 COLL VENOUS BLD VENIPUNCTURE: CPT

## 2024-04-25 ENCOUNTER — OFFICE VISIT (OUTPATIENT)
Dept: FAMILY MEDICINE | Facility: CLINIC | Age: 29
End: 2024-04-25
Payer: COMMERCIAL

## 2024-04-25 VITALS
HEIGHT: 75 IN | BODY MASS INDEX: 23.65 KG/M2 | WEIGHT: 190.2 LBS | RESPIRATION RATE: 16 BRPM | TEMPERATURE: 97.3 F | SYSTOLIC BLOOD PRESSURE: 114 MMHG | DIASTOLIC BLOOD PRESSURE: 78 MMHG | OXYGEN SATURATION: 100 % | HEART RATE: 73 BPM

## 2024-04-25 DIAGNOSIS — B27.99 INFECTIOUS MONONUCLEOSIS, WITH OTHER COMPLICATION, INFECTIOUS MONONUCLEOSIS DUE TO UNSPECIFIED ORGANISM: Primary | ICD-10-CM

## 2024-04-25 DIAGNOSIS — B17.9 ACUTE HEPATITIS: ICD-10-CM

## 2024-04-25 DIAGNOSIS — R16.1 SPLENOMEGALY: ICD-10-CM

## 2024-04-25 PROCEDURE — 80076 HEPATIC FUNCTION PANEL: CPT | Performed by: FAMILY MEDICINE

## 2024-04-25 PROCEDURE — 36415 COLL VENOUS BLD VENIPUNCTURE: CPT | Performed by: FAMILY MEDICINE

## 2024-04-25 PROCEDURE — 99213 OFFICE O/P EST LOW 20 MIN: CPT | Performed by: FAMILY MEDICINE

## 2024-04-25 NOTE — PROGRESS NOTES
"Assessment & Plan   Infectious mononucleosis, with other complication, infectious mononucleosis due to unspecified organism  - Hepatic panel (Albumin, ALT, AST, Bili, Alk Phos, TP)  - US Abdomen Limited    Acute hepatitis  - Hepatic panel (Albumin, ALT, AST, Bili, Alk Phos, TP)    Splenomegaly  - US Abdomen Limited    Subjective   José is a 29 year old, presenting for the following health issues:  RECHECK      4/25/2024    12:06 PM   Additional Questions   Roomed by Rashaun VALLE RN     In [place], he walked 30k steps a day. He chose not to ride a horse due to risk of trauma. Had a couple sips of alcohol, but not full drinks. He feels well. He did have a day of a fever in [place], but no other symptoms.    History of Present Illness       Reason for visit:  Follow up for Mono    He eats 4 or more servings of fruits and vegetables daily.He consumes 3 sweetened beverage(s) daily.He exercises with enough effort to increase his heart rate 30 to 60 minutes per day.  He exercises with enough effort to increase his heart rate 3 or less days per week. He is missing 7 dose(s) of medications per week.  He is not taking prescribed medications regularly due to other.       Objective    /78 (BP Location: Left arm, Patient Position: Sitting, Cuff Size: Adult Regular)   Pulse 73   Temp 97.3  F (36.3  C) (Tympanic)   Resp 16   Ht 1.892 m (6' 2.5\")   Wt 86.3 kg (190 lb 3.2 oz)   SpO2 100%   BMI 24.09 kg/m    Body mass index is 24.09 kg/m .  Physical Exam   GENERAL: alert and no distress  EYES: Eyes grossly normal to inspection, anicteric  ABDOMEN: soft, nontender, and bowel sounds normal. I question is hepatosplenomegaly is still present.    Lab on 04/10/2024   Component Date Value Ref Range Status    Protein Total 04/10/2024 7.8  6.4 - 8.3 g/dL Final    Albumin 04/10/2024 4.1  3.5 - 5.2 g/dL Final    Bilirubin Total 04/10/2024 1.7 (H)  <=1.2 mg/dL Final    Alkaline Phosphatase 04/10/2024 193 (H)  40 - 150 U/L Final    Reference " intervals for this test were updated on 11/14/2023 to more accurately reflect our healthy population. There may be differences in the flagging of prior results with similar values performed with this method. Interpretation of those prior results can be made in the context of the updated reference intervals.    AST 04/10/2024 93 (H)  0 - 45 U/L Final    Reference intervals for this test were updated on 6/12/2023 to more accurately reflect our healthy population. There may be differences in the flagging of prior results with similar values performed with this method. Interpretation of those prior results can be made in the context of the updated reference intervals.    ALT 04/10/2024 197 (H)  0 - 70 U/L Final    Reference intervals for this test were updated on 6/12/2023 to more accurately reflect our healthy population. There may be differences in the flagging of prior results with similar values performed with this method. Interpretation of those prior results can be made in the context of the updated reference intervals.      Bilirubin Direct 04/10/2024 0.92 (H)  0.00 - 0.30 mg/dL Final   Signed Electronically by: CAROL MALHOTRA MD

## 2024-04-26 LAB
ALBUMIN SERPL BCG-MCNC: 4.5 G/DL (ref 3.5–5.2)
ALP SERPL-CCNC: 83 U/L (ref 40–150)
ALT SERPL W P-5'-P-CCNC: 50 U/L (ref 0–70)
AST SERPL W P-5'-P-CCNC: 40 U/L (ref 0–45)
BILIRUB DIRECT SERPL-MCNC: 0.41 MG/DL (ref 0–0.3)
BILIRUB SERPL-MCNC: 1.1 MG/DL
PROT SERPL-MCNC: 7.6 G/DL (ref 6.4–8.3)

## 2024-05-19 ENCOUNTER — HEALTH MAINTENANCE LETTER (OUTPATIENT)
Age: 29
End: 2024-05-19

## 2025-06-08 ENCOUNTER — HEALTH MAINTENANCE LETTER (OUTPATIENT)
Age: 30
End: 2025-06-08